# Patient Record
Sex: MALE | Race: WHITE | URBAN - METROPOLITAN AREA
[De-identification: names, ages, dates, MRNs, and addresses within clinical notes are randomized per-mention and may not be internally consistent; named-entity substitution may affect disease eponyms.]

---
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
## 2025-04-16 ENCOUNTER — OFFICE VISIT (OUTPATIENT)
Dept: URGENT CARE | Age: 25
End: 2025-04-16
Payer: COMMERCIAL

## 2025-04-16 VITALS
TEMPERATURE: 98.6 F | RESPIRATION RATE: 18 BRPM | BODY MASS INDEX: 23.1 KG/M2 | HEIGHT: 74 IN | OXYGEN SATURATION: 97 % | WEIGHT: 180 LBS | HEART RATE: 79 BPM | SYSTOLIC BLOOD PRESSURE: 103 MMHG | DIASTOLIC BLOOD PRESSURE: 73 MMHG

## 2025-04-16 DIAGNOSIS — J02.9 SORE THROAT: ICD-10-CM

## 2025-04-16 DIAGNOSIS — J02.0 STREPTOCOCCAL PHARYNGITIS: Primary | ICD-10-CM

## 2025-04-16 DIAGNOSIS — Z87.19 HISTORY OF BILIARY COLIC: ICD-10-CM

## 2025-04-16 LAB
POC HUMAN RHINOVIRUS PCR: NEGATIVE
POC INFLUENZA A VIRUS PCR: NEGATIVE
POC INFLUENZA B VIRUS PCR: NEGATIVE
POC RESPIRATORY SYNCYTIAL VIRUS PCR: NEGATIVE
POC STREPTOCOCCUS PYOGENES (GROUP A STREP) PCR: POSITIVE

## 2025-04-16 PROCEDURE — 1036F TOBACCO NON-USER: CPT | Performed by: STUDENT IN AN ORGANIZED HEALTH CARE EDUCATION/TRAINING PROGRAM

## 2025-04-16 PROCEDURE — 87651 STREP A DNA AMP PROBE: CPT | Performed by: STUDENT IN AN ORGANIZED HEALTH CARE EDUCATION/TRAINING PROGRAM

## 2025-04-16 PROCEDURE — 87631 RESP VIRUS 3-5 TARGETS: CPT | Performed by: STUDENT IN AN ORGANIZED HEALTH CARE EDUCATION/TRAINING PROGRAM

## 2025-04-16 PROCEDURE — 99203 OFFICE O/P NEW LOW 30 MIN: CPT | Performed by: STUDENT IN AN ORGANIZED HEALTH CARE EDUCATION/TRAINING PROGRAM

## 2025-04-16 PROCEDURE — 3008F BODY MASS INDEX DOCD: CPT | Performed by: STUDENT IN AN ORGANIZED HEALTH CARE EDUCATION/TRAINING PROGRAM

## 2025-04-16 RX ORDER — AMOXICILLIN 500 MG/1
500 CAPSULE ORAL EVERY 8 HOURS SCHEDULED
Qty: 21 CAPSULE | Refills: 0 | Status: SHIPPED | OUTPATIENT
Start: 2025-04-16 | End: 2025-04-23

## 2025-04-16 ASSESSMENT — PATIENT HEALTH QUESTIONNAIRE - PHQ9
1. LITTLE INTEREST OR PLEASURE IN DOING THINGS: NOT AT ALL
SUM OF ALL RESPONSES TO PHQ9 QUESTIONS 1 AND 2: 0
2. FEELING DOWN, DEPRESSED OR HOPELESS: NOT AT ALL

## 2025-04-16 NOTE — LETTER
April 16, 2025     Patient: Prem Ireland   YOB: 2000   Date of Visit: 4/16/2025       To Whom It May Concern:    Prem Ireland was seen in my clinic on 4/16/2025 at 10:00 am. Please excuse Prem for his absence from work on this day to make the appointment.    If you have any questions or concerns, please don't hesitate to call.         Sincerely,         Marlon Ennis,         CC: No Recipients

## 2025-04-16 NOTE — PATIENT INSTRUCTIONS
Follow up with PCP. We advised seeking immediate emergency medical attention if symptoms fail to improve, worsen or any concerning symptoms arise. Patient voiced full understanding and agreement to plan.    We discussed with the patient our clinical thoughts at this time given the above findings and clinical assessment and we had a shared decision-making conversation in a patient-centered decision-making model on how to proceed forward. The patient was instructed on the importance of a close follow-up with PCP and other care providers. The patient was also advised that an Urgent care diagnosis is often a preliminary impression and that definitive care is often not able to be given completley in the Urgent care setting.

## 2025-04-16 NOTE — PROGRESS NOTES
"Subjective   Patient ID: Prem Ireland is a 24 y.o. male. They present today with a chief complaint of Sore Throat (X2 days and flank pain).    History of Present Illness  Prem is a 24-year-old male who presents to the urgent care for evaluation of sore throat for 2 days and occasional right flank discomfort that is been off and on for a while that gets worse with a eating large meals.  Patient denies recorded fever, difficulty breathing or choking or chest pain or shortness of breath.  Patient denies a cough or pain with breathing.  Patient notes painful swallowing.  Patient is seeking evaluation reassurance as he is moved from [place] and does not have an established primary care provider.  Patient states \"I am a big eater and eat large meals and often get discomfort in the right upper abdomen after eating large meals\".  Patient denies vomiting, diarrhea or other associated symptoms otherwise.  Patient is seeking evaluation reassurance.  Patient denies active abdominal pain currently or any reproducible symptoms in the right flank or right upper abdomen at this time.  No other symptoms or concerns otherwise reported.    Past Medical History  Allergies as of 04/16/2025 - Reviewed 04/16/2025   Allergen Reaction Noted    Sulfa (sulfonamide antibiotics) Rash 10/20/2012       Prescriptions Prior to Admission[1]     Medical History[2]    Surgical History[3]     reports that he has never smoked. He has never used smokeless tobacco.    Review of Systems  A 10-point review of systems was performed, otherwise unremarkable unless stated in the history of present illness.                Objective    Vitals:    04/16/25 1000   BP: 103/73   Pulse: 79   Resp: 18   Temp: 37 °C (98.6 °F)   SpO2: 97%   Weight: 81.6 kg (180 lb)   Height: 1.88 m (6' 2\")     No LMP for male patient.    Gen: Vitals noted and reviewed, no evidence of acute distress, well developed and afebrile.   Head/Face: Atraumatic and normocephalic.   ENT: TMs " clear bilaterally, EACs unremarkable. Mastoids non-tender. Posterior oropharynx with erythema, exudates, and 1+ b/l tonsillar swelling. Uvula is in the midline and non-edematous.   Neck: Supple. No meningismus through full range of motion. +b/l anterior cervical lymphadenopathy.   Cardiac: Regular rate and rhythm no murmur.   Lungs: Clear to auscultation throughout, No evidence of wheezing, rhonchi or crackles. Good aeration throughout.   Abdomen: Soft non-tender throughout. Normoactive bowel sounds. No evidence of palpable masses    Skin: Without evidence of ecchymosis, wounds, or rashes.  Psych: Mood and affect appropriate for setting.       Point of Care Test & Imaging Results from this visit  Results for orders placed or performed in visit on 04/16/25   POCT SPOTFIRE R/ST Panel Mini w/Strep A ([facility]) manually resulted   Result Value Ref Range    POC Group A Strep, PCR Positive (A) Negative    POC Respiratory Syncytial Virus PCR Negative Negative    POC Influenza A Virus PCR Negative Negative    POC Influenza B Virus PCR Negative Negative    POC Human Rhinovirus PCR Negative Negative      Imaging  No results found.    Cardiology, Vascular, and Other Imaging  No other imaging results found for the past 2 days      Diagnostic study results (if any) were reviewed by Marlon Ennis DO.    Assessment/Plan   Allergies, medications, history, and pertinent labs/EKGs/Imaging reviewed by Marlon Ennis DO.     Medical Decision Making  Discussed with the patient symptoms and clinical presentation findings suggestive of an acute pharyngitis likely secondary strep infection though not limited to this.  We advise close symptom monitoring supportive treatment use of over-the-counter remedies to aid in symptom management.  Reviewed patient allergies medications and prescribed amoxicillin for antimicrobial coverage.  We did thoroughly discussed the patient's flank/abdominal symptoms that are not present today and advised this  may be secondary to gastric etiology versus biliary colic though not confirmed and this warrants further evaluation and testing and screening from a primary care provider.  We heavily reviewed if acute abdominal symptoms, worsening, severe abdominal pain, difficulty breathing, chest pain, associated fever or any concerning signs or symptoms develop to seek immediate emergency medical attention.  We provide a referral to primary care provider in office today and advise close follow-up on biliary colic type symptoms though not confirmed as the etiology. Follow up with PCP. We advised seeking immediate emergency medical attention if symptoms fail to improve, worsen or any concerning symptoms arise. Patient voiced full understanding and agreement to plan.    We discussed with the patient our clinical thoughts at this time given the above findings and clinical assessment and we had a shared decision-making conversation in a patient-centered decision-making model on how to proceed forward. The patient was instructed on the importance of a close follow-up with PCP and other care providers. The patient was also advised that an Urgent care diagnosis is often a preliminary impression and that definitive care is often not able to be given completley in the Urgent care setting.    Orders and Diagnoses  Diagnoses and all orders for this visit:  Streptococcal pharyngitis  -     Referral to Primary Care; Future  -     amoxicillin (Amoxil) 500 mg capsule; Take 1 capsule (500 mg) by mouth every 8 hours for 7 days. Take with food.  Sore throat  -     POCT SPOTFIRE R/ST Panel Mini w/Strep A ([facility]) manually resulted  -     Referral to Primary Care; Future  History of biliary colic      Medical Admin Record      Patient disposition: Home    Electronically signed by Marlon Ennis DO  10:49 AM           [1] (Not in a hospital admission)   [2] No past medical history on file.  [3] No past surgical history on file.